# Patient Record
Sex: FEMALE | Race: OTHER | URBAN - METROPOLITAN AREA
[De-identification: names, ages, dates, MRNs, and addresses within clinical notes are randomized per-mention and may not be internally consistent; named-entity substitution may affect disease eponyms.]

---

## 2023-11-28 ENCOUNTER — OFFICE VISIT (OUTPATIENT)
Dept: OCCUPATIONAL MEDICINE | Age: 53
End: 2023-11-28

## 2023-11-28 VITALS
DIASTOLIC BLOOD PRESSURE: 98 MMHG | SYSTOLIC BLOOD PRESSURE: 152 MMHG | HEART RATE: 81 BPM | OXYGEN SATURATION: 98 % | TEMPERATURE: 98 F | RESPIRATION RATE: 16 BRPM

## 2023-11-28 DIAGNOSIS — J32.9 RHINOSINUSITIS: Primary | ICD-10-CM

## 2023-11-28 LAB
GROUP A STREP ANTIGEN, POC: NEGATIVE
VALID INTERNAL CONTROL, POC: YES

## 2023-11-28 RX ORDER — AMOXICILLIN AND CLAVULANATE POTASSIUM 875; 125 MG/1; MG/1
1 TABLET, FILM COATED ORAL 2 TIMES DAILY
Qty: 10 TABLET | Refills: 0 | Status: SHIPPED | OUTPATIENT
Start: 2023-11-28 | End: 2023-12-03

## 2023-11-28 ASSESSMENT — ENCOUNTER SYMPTOMS
ABDOMINAL PAIN: 0
EYE DISCHARGE: 0
EYE ITCHING: 0
DIARRHEA: 0
RHINORRHEA: 1
NAUSEA: 0
SINUS PAIN: 1
COUGH: 1
SWOLLEN GLANDS: 1
SINUS PRESSURE: 1
WHEEZING: 0
EYE REDNESS: 0
SHORTNESS OF BREATH: 0
CHEST TIGHTNESS: 0
VOMITING: 0
EYE PAIN: 0
SORE THROAT: 1

## 2023-11-28 NOTE — PROGRESS NOTES
found for this visit on 11/28/23. Physical Exam  Vitals reviewed. Constitutional:       General: She is awake. She is not in acute distress. Appearance: Normal appearance. She is well-developed and well-groomed. HENT:      Head: Normocephalic. Right Ear: Hearing and external ear normal. A middle ear effusion is present. Left Ear: Hearing and external ear normal. A middle ear effusion is present. Nose: Mucosal edema present. Right Turbinates: Swollen. Left Turbinates: Swollen. Right Sinus: No maxillary sinus tenderness or frontal sinus tenderness. Left Sinus: Maxillary sinus tenderness present. No frontal sinus tenderness. Mouth/Throat:      Lips: Pink. No lesions. Mouth: Mucous membranes are moist.      Pharynx: Uvula midline. Posterior oropharyngeal erythema present. No pharyngeal swelling. Tonsils: No tonsillar exudate. Comments: S/P tonsillectomy  Eyes:      General: Lids are normal.      Conjunctiva/sclera: Conjunctivae normal.   Neck:      Trachea: No tracheal deviation. Cardiovascular:      Rate and Rhythm: Normal rate and regular rhythm. Heart sounds: Normal heart sounds. Pulmonary:      Effort: Pulmonary effort is normal.      Breath sounds: Normal breath sounds. Musculoskeletal:      Cervical back: Neck supple. Lymphadenopathy:      Head:      Right side of head: Submandibular adenopathy present. No submental, tonsillar, preauricular, posterior auricular or occipital adenopathy. Left side of head: Submandibular adenopathy present. No submental, tonsillar, preauricular, posterior auricular or occipital adenopathy. Neurological:      Mental Status: She is alert and oriented to person, place, and time. Psychiatric:         Behavior: Behavior is cooperative. ASSESSMENT and PLAN    Lakia So was seen today for uri.     Diagnoses and all orders for this visit:    Rhinosinusitis  Comments:  Augmentin as prescribed with

## 2025-02-05 ENCOUNTER — OFFICE VISIT (OUTPATIENT)
Age: 55
End: 2025-02-05

## 2025-02-05 VITALS
TEMPERATURE: 98.2 F | HEART RATE: 87 BPM | OXYGEN SATURATION: 98 % | DIASTOLIC BLOOD PRESSURE: 92 MMHG | RESPIRATION RATE: 16 BRPM | SYSTOLIC BLOOD PRESSURE: 146 MMHG

## 2025-02-05 DIAGNOSIS — J32.9 RHINOSINUSITIS: Primary | ICD-10-CM

## 2025-02-05 LAB
GROUP A STREP ANTIGEN, POC: NEGATIVE
INFLUENZA A ANTIGEN, POC: NEGATIVE
INFLUENZA B ANTIGEN, POC: NEGATIVE
LOT EXPIRE DATE: NORMAL
LOT KIT NUMBER: NORMAL
SARS-COV-2 RNA, POC: NEGATIVE
VALID INTERNAL CONTROL, POC: YES
VALID INTERNAL CONTROL: YES
VENDOR AND KIT NAME POC: NORMAL

## 2025-02-05 ASSESSMENT — ENCOUNTER SYMPTOMS
EYE REDNESS: 0
VOMITING: 0
WHEEZING: 0
ABDOMINAL PAIN: 0
EYE ITCHING: 0
EYE DISCHARGE: 0
DIARRHEA: 0
SINUS PAIN: 0
SWOLLEN GLANDS: 0
RHINORRHEA: 1
SINUS PRESSURE: 0
NAUSEA: 0
SORE THROAT: 0
COUGH: 1
EYE PAIN: 0

## 2025-02-05 NOTE — PROGRESS NOTES
PROGRESS NOTE    SUBJECTIVE:   Julisa Xavier is a 54 y.o. female [facility] for cold symptoms that started yesterday. Reports multiple sick coworkers    Chief Complaint    Cold Symptoms           History provided by:  Patient   used: No    Cold Symptoms   This is a new problem. The current episode started yesterday. The problem has been gradually worsening. There has been no fever. Associated symptoms include congestion, coughing, a plugged ear sensation, rhinorrhea and sneezing. Pertinent negatives include no abdominal pain, chest pain, diarrhea, dysuria, ear pain, headaches, joint pain, joint swelling, nausea, neck pain, rash, sinus pain, sore throat, swollen glands, vomiting or wheezing. She has tried nothing for the symptoms.       Current Outpatient Medications   Medication Sig Dispense Refill    cetirizine (ZYRTEC) 10 MG tablet Take 1 tablet by mouth daily 30 tablet 2     No current facility-administered medications for this visit.      No Known Allergies          Review of Systems   Constitutional:  Negative for chills, fatigue and fever.   HENT:  Positive for congestion, postnasal drip, rhinorrhea and sneezing. Negative for ear pain, sinus pressure, sinus pain and sore throat.    Eyes:  Negative for pain, discharge, redness and itching.   Respiratory:  Positive for cough. Negative for wheezing.    Cardiovascular:  Negative for chest pain.   Gastrointestinal:  Negative for abdominal pain, diarrhea, nausea and vomiting.   Genitourinary:  Negative for dysuria.   Musculoskeletal:  Negative for arthralgias, joint pain, myalgias and neck pain.   Skin:  Negative for rash.   Allergic/Immunologic: Positive for environmental allergies.   Neurological:  Negative for dizziness, light-headedness and headaches.          OBJECTIVE:  BP (!) 146/92 (Site: Left Upper Arm, Position: Sitting, Cuff Size: Medium Adult)   Pulse 87